# Patient Record
Sex: MALE | Race: WHITE
[De-identification: names, ages, dates, MRNs, and addresses within clinical notes are randomized per-mention and may not be internally consistent; named-entity substitution may affect disease eponyms.]

---

## 2020-11-04 ENCOUNTER — HOSPITAL ENCOUNTER (EMERGENCY)
Dept: HOSPITAL 76 - ED | Age: 70
Discharge: ANOTHER HOSPITAL | End: 2020-11-04
Payer: MEDICARE

## 2020-11-04 VITALS — DIASTOLIC BLOOD PRESSURE: 58 MMHG | SYSTOLIC BLOOD PRESSURE: 128 MMHG

## 2020-11-04 DIAGNOSIS — I51.7: ICD-10-CM

## 2020-11-04 DIAGNOSIS — I44.2: ICD-10-CM

## 2020-11-04 DIAGNOSIS — R11.0: ICD-10-CM

## 2020-11-04 DIAGNOSIS — R00.1: Primary | ICD-10-CM

## 2020-11-04 DIAGNOSIS — R42: ICD-10-CM

## 2020-11-04 DIAGNOSIS — I44.7: ICD-10-CM

## 2020-11-04 LAB
ALBUMIN DIAFP-MCNC: 4.7 G/DL (ref 3.2–5.5)
ALBUMIN/GLOB SERPL: 1.4 {RATIO} (ref 1–2.2)
ALP SERPL-CCNC: 71 IU/L (ref 42–121)
ALT SERPL W P-5'-P-CCNC: 33 IU/L (ref 10–60)
ANION GAP SERPL CALCULATED.4IONS-SCNC: 11 MMOL/L (ref 6–13)
AST SERPL W P-5'-P-CCNC: 22 IU/L (ref 10–42)
BASOPHILS NFR BLD AUTO: 0 10^3/UL (ref 0–0.1)
BASOPHILS NFR BLD AUTO: 0.3 %
BILIRUB BLD-MCNC: 1.8 MG/DL (ref 0.2–1)
BUN SERPL-MCNC: 11 MG/DL (ref 6–20)
CALCIUM UR-MCNC: 9.6 MG/DL (ref 8.5–10.3)
CHLORIDE SERPL-SCNC: 106 MMOL/L (ref 101–111)
CO2 SERPL-SCNC: 25 MMOL/L (ref 21–32)
CREAT SERPLBLD-SCNC: 0.9 MG/DL (ref 0.6–1.2)
EOSINOPHIL # BLD AUTO: 0 10^3/UL (ref 0–0.7)
EOSINOPHIL NFR BLD AUTO: 0.2 %
ERYTHROCYTE [DISTWIDTH] IN BLOOD BY AUTOMATED COUNT: 13.2 % (ref 12–15)
GLOBULIN SER-MCNC: 3.3 G/DL (ref 2.1–4.2)
GLUCOSE SERPL-MCNC: 116 MG/DL (ref 70–100)
HGB UR QL STRIP: 15.9 G/DL (ref 14–18)
LIPASE SERPL-CCNC: 32 U/L (ref 22–51)
LYMPHOCYTES # SPEC AUTO: 2.5 10^3/UL (ref 1.5–3.5)
LYMPHOCYTES NFR BLD AUTO: 28.4 %
MCH RBC QN AUTO: 30.1 PG (ref 27–31)
MCHC RBC AUTO-ENTMCNC: 32.4 G/DL (ref 32–36)
MCV RBC AUTO: 92.6 FL (ref 80–94)
MONOCYTES # BLD AUTO: 0.7 10^3/UL (ref 0–1)
MONOCYTES NFR BLD AUTO: 7.9 %
NEUTROPHILS # BLD AUTO: 5.5 10^3/UL (ref 1.5–6.6)
NEUTROPHILS # SNV AUTO: 8.7 X10^3/UL (ref 4.8–10.8)
NEUTROPHILS NFR BLD AUTO: 63 %
PDW BLD AUTO: 11.7 FL (ref 7.4–11.4)
PLATELET # BLD: 203 10^3/UL (ref 130–450)
PROT SPEC-MCNC: 8 G/DL (ref 6.7–8.2)
RBC MAR: 5.29 10^6/UL (ref 4.7–6.1)
SODIUM SERPLBLD-SCNC: 142 MMOL/L (ref 135–145)

## 2020-11-04 PROCEDURE — 36415 COLL VENOUS BLD VENIPUNCTURE: CPT

## 2020-11-04 PROCEDURE — 83690 ASSAY OF LIPASE: CPT

## 2020-11-04 PROCEDURE — 84484 ASSAY OF TROPONIN QUANT: CPT

## 2020-11-04 PROCEDURE — 85025 COMPLETE CBC W/AUTO DIFF WBC: CPT

## 2020-11-04 PROCEDURE — 83880 ASSAY OF NATRIURETIC PEPTIDE: CPT

## 2020-11-04 PROCEDURE — 71045 X-RAY EXAM CHEST 1 VIEW: CPT

## 2020-11-04 PROCEDURE — 96374 THER/PROPH/DIAG INJ IV PUSH: CPT

## 2020-11-04 PROCEDURE — 96375 TX/PRO/DX INJ NEW DRUG ADDON: CPT

## 2020-11-04 PROCEDURE — 93005 ELECTROCARDIOGRAM TRACING: CPT

## 2020-11-04 PROCEDURE — 99291 CRITICAL CARE FIRST HOUR: CPT

## 2020-11-04 PROCEDURE — 80053 COMPREHEN METABOLIC PANEL: CPT

## 2020-11-04 NOTE — ED PHYSICIAN DOCUMENTATION
PD HPI CHEST PAIN





- Stated complaint


Stated Complaint: LOW HR





- Chief complaint


Chief Complaint: Cardiac





- History obtained from


History obtained from: Patient





- Additional information


Additional information: 





Early Monday morning, 2 days ago he woke up and felt sweaty and a little short 

of breath.  Subsequently had fatigue.  The day prior he was hiking and doing 

everything without issue.  He has a history of mild bradycardia but he noticed 

that his pulse was even lower than usual.  He has not been had any chest pain 

but he does have fatigue and shortness of breath with exertion.  He is not on an

y rate lowering medications, his only prescriptions are atorvastatin and 

amlodipine.





Review of Systems


Ten Systems: 10 systems reviewed and negative


Constitutional: reports: Fatigue.  denies: Fever, Chills


Cardiac: denies: Palpitations


Respiratory: reports: Dyspnea.  denies: Cough


GI: denies: Abdominal Pain, Nausea, Vomiting





PD PAST MEDICAL HISTORY





- Allergies


Allergies/Adverse Reactions: 


                                    Allergies











Allergy/AdvReac Type Severity Reaction Status Date / Time


 


No Known Drug Allergies Allergy   Verified 11/04/20 12:50














PD ED PE NORMAL





- Vitals


Vital signs reviewed: Yes





- General


General: Alert and oriented X 3, No acute distress





- HEENT


HEENT: PERRL, EOMI





- Neck


Neck: Supple, no meningeal sign, No bony TTP





- Cardiac


Cardiac: No murmur, Other (Very bradycardic)





- Respiratory


Respiratory: No respiratory distress, Clear bilaterally





- Abdomen


Abdomen: Non tender





- Back


Back: No CVA TTP, No spinal TTP





- Derm


Derm: Normal color, Warm and dry





- Extremities


Extremities: No edema, No calf tenderness / cord





- Neuro


Neuro: Alert and oriented X 3, Normal speech





Results





- Vitals


Vitals: 


                               Vital Signs - 24 hr











  11/04/20 11/04/20 11/04/20





  12:42 13:00 13:13


 


Temperature 36.4 C L 36.8 C 


 


Heart Rate 31 L 31 L 32 L


 


Respiratory 18 18 12





Rate   


 


Blood Pressure 148/70 H 155/73 H 152/70 H


 


O2 Saturation 98 95 95














  11/04/20 11/04/20 11/04/20





  13:23 13:57 14:00


 


Temperature   


 


Heart Rate 30 L 30 L 30 L


 


Respiratory 18 22 22





Rate   


 


Blood Pressure 129/72 141/73 H 141/73 H


 


O2 Saturation 95 96 96














  11/04/20 11/04/20 11/04/20





  14:30 16:25 16:40


 


Temperature   


 


Heart Rate 28 L 75 37 L


 


Respiratory 14 14 16





Rate   


 


Blood Pressure 134/93 H 130/70 126/63


 


O2 Saturation 99 97 96














  11/04/20 11/04/20





  17:00 17:30


 


Temperature  


 


Heart Rate 36 L 32 L


 


Respiratory 18 18





Rate  


 


Blood Pressure 128/60 128/58 L


 


O2 Saturation 93 94








                                     Oxygen











O2 Source                      Room air

















- EKG (time done)


  ** 1245


Rate: Rate (enter#) (31)


Rhythm: Sinus bradycardia


Intervals: Prolonged NH


Ischemia: ST depression





  ** 1713


Rate: Rate (enter#) (36)


Rhythm: Other (complete AV block)


Intervals: LBBB


Computer interpretation: Agree with computer





- Labs


Labs: 


                                Laboratory Tests











  11/04/20 11/04/20 11/04/20





  12:51 12:51 12:51


 


WBC  8.7  


 


RBC  5.29  


 


Hgb  15.9  


 


Hct  49.0  


 


MCV  92.6  


 


MCH  30.1  


 


MCHC  32.4  


 


RDW  13.2  


 


Plt Count  203  


 


MPV  11.7 H  


 


Neut # (Auto)  5.5  


 


Lymph # (Auto)  2.5  


 


Mono # (Auto)  0.7  


 


Eos # (Auto)  0.0  


 


Baso # (Auto)  0.0  


 


Absolute Nucleated RBC  0.00  


 


Nucleated RBC %  0.0  


 


Sodium   142 


 


Potassium   4.1 


 


Chloride   106 


 


Carbon Dioxide   25 


 


Anion Gap   11.0 


 


BUN   11 


 


Creatinine   0.9 


 


Estimated GFR (MDRD)   84 L 


 


Glucose   116 H 


 


Calcium   9.6 


 


Total Bilirubin   1.8 H 


 


AST   22 


 


ALT   33 


 


Alkaline Phosphatase   71 


 


Troponin I High Sens    31.6 H*


 


B-Natriuretic Peptide   


 


Total Protein   8.0 


 


Albumin   4.7 


 


Globulin   3.3 


 


Albumin/Globulin Ratio   1.4 


 


Lipase   32 














  11/04/20 11/04/20





  12:51 17:19


 


WBC  


 


RBC  


 


Hgb  


 


Hct  


 


MCV  


 


MCH  


 


MCHC  


 


RDW  


 


Plt Count  


 


MPV  


 


Neut # (Auto)  


 


Lymph # (Auto)  


 


Mono # (Auto)  


 


Eos # (Auto)  


 


Baso # (Auto)  


 


Absolute Nucleated RBC  


 


Nucleated RBC %  


 


Sodium  


 


Potassium  


 


Chloride  


 


Carbon Dioxide  


 


Anion Gap  


 


BUN  


 


Creatinine  


 


Estimated GFR (MDRD)  


 


Glucose  


 


Calcium  


 


Total Bilirubin  


 


AST  


 


ALT  


 


Alkaline Phosphatase  


 


Troponin I High Sens   26.0 H*


 


B-Natriuretic Peptide  433 H 


 


Total Protein  


 


Albumin  


 


Globulin  


 


Albumin/Globulin Ratio  


 


Lipase  














- Rads (name of study)


  ** 1v chest


Radiology: EMP read contemporaneously (Cardiomegaly without acute disease)





PD MEDICAL DECISION MAKING





- ED course


ED course: 





70-year-old gentleman presents with symptomatic bradycardia.  Atropine briefly 

brought his heart rate up to 35 but then it settled back at 29-31.  He is 

hemodynamically stable.  Mentation is normal.  Spoke with Dr. Leyden at [facility] 

for transfer to a higher level of care.





Dr. Leyden called me, they couldn't find a bed at [place].  The hospitalist at 

[facility] said he would accept but I needed to talk to their cardiologist first.  

Dr. Hilton he was called at 3:11 PM.





Spoke with the on-call cardiologist at [facility], Dr. Hilton at 3:20 PM and he 

will see in consult but defers to the hospitalist for admit and [facility] was 

called back to arrange.





Per Dr. Leyden at [facility] he is excepted by Dr. Hall to [facility] and cobras were 

completed at 3:28 PM.





Subsequently around 3:45 PM started developed significant pauses with about 6 to

8 seconds.  He did become dizzy but not syncopal with these.  Pacer pads were 

placed and a dopamine drip was begun.





His heart rate responded up to about 40 with 10 mics per kilo per minute of 

dopamine.  Notified there would be a delay to transfer because of lack of beds, 

reportedly there is a lack of beds up and down the [facility] corridor.





Notified around 5:10 PM patient was feeling worse, nauseous and gray.  Another 

key EKG was ordered, it has changed a bit since the first 1, now complete heart 

block, but the rate is not too bad at 36.  It was faxed to the cardiologist.





Given the delays and potential for decompensation, I called the ER at [facility] and

he was excepted there by Dr. Marie and cobras were completed.  He will go 

via [facility] given the potential for hemodynamic decompensation and shock.





- Critical Care


Time(min): 40


Time Includes: Direct patient care, Review records, Reassess patient, Document 

care, Coordinate care, Medical consult, Family consult for tx dec


Data interpretation: Labs, Pulse ox


Procedures excluded from critical care time: EKG





Departure





- Departure


Disposition: 02 Transfer Acute Care Hosp


Clinical Impression: 


 Symptomatic bradycardia





Condition: Serious

## 2020-11-04 NOTE — XRAY REPORT
PROCEDURE:  Chest 1 View X-Ray

 

INDICATIONS:  Chest Pain

 

TECHNIQUE:  One view of the chest was acquired.  

 

COMPARISON:  None

 

FINDINGS:  

 

Surgical changes and devices:  None.  

 

Lungs and pleura:  No pleural effusions or pneumothorax.  Lungs are clear.  

 

Mediastinum:  Mediastinal contours appear normal.  Heart is enlarged

 

Bones and chest wall:  No suspicious bony lesions.  Overlying soft tissues appear unremarkable.  

 

 

IMPRESSION:  

 

1. Cardiomegaly.

 

2. No acute cardiopulmonary disease process.

 

Reviewed by: Ntete Mullen MD, PhD on 11/4/2020 1:34 PM PST

Approved by: Nette Mullen MD, PhD on 11/4/2020 1:34 PM PST

 

 

Station ID:  [id number]

## 2022-03-10 ENCOUNTER — HOSPITAL ENCOUNTER (OUTPATIENT)
Dept: HOSPITAL 76 - LAB.N | Age: 72
Discharge: HOME | End: 2022-03-10
Attending: FAMILY MEDICINE
Payer: MEDICARE

## 2022-03-10 DIAGNOSIS — R31.9: Primary | ICD-10-CM

## 2022-03-10 LAB
ALBUMIN DIAFP-MCNC: 4.7 G/DL (ref 3.2–5.5)
ALBUMIN/GLOB SERPL: 1.6 {RATIO} (ref 1–2.2)
ALP SERPL-CCNC: 72 IU/L (ref 42–121)
ALT SERPL W P-5'-P-CCNC: 29 IU/L (ref 10–60)
ANION GAP SERPL CALCULATED.4IONS-SCNC: 11 MMOL/L (ref 6–13)
AST SERPL W P-5'-P-CCNC: 21 IU/L (ref 10–42)
BASOPHILS NFR BLD AUTO: 0 10^3/UL (ref 0–0.1)
BASOPHILS NFR BLD AUTO: 0.5 %
BILIRUB BLD-MCNC: 1.8 MG/DL (ref 0.2–1)
BUN SERPL-MCNC: 16 MG/DL (ref 6–20)
CALCIUM UR-MCNC: 9.4 MG/DL (ref 8.5–10.3)
CHLORIDE SERPL-SCNC: 102 MMOL/L (ref 101–111)
CO2 SERPL-SCNC: 25 MMOL/L (ref 21–32)
CREAT SERPLBLD-SCNC: 0.7 MG/DL (ref 0.6–1.2)
EOSINOPHIL # BLD AUTO: 0 10^3/UL (ref 0–0.7)
EOSINOPHIL NFR BLD AUTO: 0.5 %
ERYTHROCYTE [DISTWIDTH] IN BLOOD BY AUTOMATED COUNT: 12.5 % (ref 12–15)
GFRSERPLBLD MDRD-ARVRAT: 111 ML/MIN/{1.73_M2} (ref 89–?)
GLOBULIN SER-MCNC: 2.9 G/DL (ref 2.1–4.2)
GLUCOSE SERPL-MCNC: 107 MG/DL (ref 70–100)
HCT VFR BLD AUTO: 45.3 % (ref 42–52)
HGB UR QL STRIP: 15.3 G/DL (ref 14–18)
LYMPHOCYTES # SPEC AUTO: 2.1 10^3/UL (ref 1.5–3.5)
LYMPHOCYTES NFR BLD AUTO: 32.1 %
MCH RBC QN AUTO: 30.8 PG (ref 27–31)
MCHC RBC AUTO-ENTMCNC: 33.8 G/DL (ref 32–36)
MCV RBC AUTO: 91.1 FL (ref 80–94)
MONOCYTES # BLD AUTO: 0.6 10^3/UL (ref 0–1)
MONOCYTES NFR BLD AUTO: 9.3 %
NEUTROPHILS # BLD AUTO: 3.7 10^3/UL (ref 1.5–6.6)
NEUTROPHILS # SNV AUTO: 6.4 X10^3/UL (ref 4.8–10.8)
NEUTROPHILS NFR BLD AUTO: 57.4 %
NRBC # BLD AUTO: 0 /100WBC
NRBC # BLD AUTO: 0 X10^3/UL
PDW BLD AUTO: 12.1 FL (ref 7.4–11.4)
PLATELET # BLD: 187 10^3/UL (ref 130–450)
POTASSIUM SERPL-SCNC: 4.1 MMOL/L (ref 3.5–5)
PROT SPEC-MCNC: 7.6 G/DL (ref 6.7–8.2)
RBC MAR: 4.97 10^6/UL (ref 4.7–6.1)
SODIUM SERPLBLD-SCNC: 138 MMOL/L (ref 135–145)

## 2022-03-10 PROCEDURE — 36415 COLL VENOUS BLD VENIPUNCTURE: CPT

## 2022-03-10 PROCEDURE — 80053 COMPREHEN METABOLIC PANEL: CPT

## 2022-03-10 PROCEDURE — 85025 COMPLETE CBC W/AUTO DIFF WBC: CPT

## 2022-03-10 PROCEDURE — 87086 URINE CULTURE/COLONY COUNT: CPT

## 2022-10-26 ENCOUNTER — HOSPITAL ENCOUNTER (OUTPATIENT)
Dept: HOSPITAL 76 - DI | Age: 72
Discharge: HOME | End: 2022-10-26
Attending: PODIATRIST
Payer: MEDICARE

## 2022-10-26 DIAGNOSIS — M19.072: Primary | ICD-10-CM

## 2022-10-26 DIAGNOSIS — M20.41: ICD-10-CM

## 2022-10-26 DIAGNOSIS — M20.42: ICD-10-CM

## 2022-10-26 DIAGNOSIS — M19.071: ICD-10-CM

## 2022-10-26 NOTE — XRAY REPORT
PROCEDURE:  Foot 3 View BILAT

 

INDICATIONS:  BILATERAL FOOT PAIN

 

TECHNIQUE:  3 views of each foot were acquired.  

 

COMPARISON:  None

 

FINDINGS:  

 

Bones: No acute fracture or dislocation. There is severe degenerative joint space loss, spurring, and
 subcortical sclerosis at the first metatarsophalangeal joints bilaterally, right slightly worse than
 left. There are hammertoe deformities 2 through 5 bilaterally. Periarticular erosion is seen at the 
fourth left tarsometatarsal articulation.

 

Soft tissues:  No tibiotalar joint effusion.  Achilles tendons appear normal.  

 

IMPRESSION:  

 

1. Symmetric bilateral first MTP joint degeneration.

2. Left fourth TMT joint erosion raising possibility of an inflammatory arthritic process.

3. Symmetric bilateral 2 through fifth hammertoe deformities.

 

Reviewed by: Eva Blanco MD on 10/26/2022 5:09 PM CALLIE

Approved by: Eva Blanco MD on 10/26/2022 5:09 PM CALLIE

 

 

Station ID:  [id number]

## 2023-04-14 ENCOUNTER — HOSPITAL ENCOUNTER (EMERGENCY)
Dept: HOSPITAL 76 - ED | Age: 73
Discharge: HOME | End: 2023-04-14
Payer: MEDICARE

## 2023-04-14 VITALS — SYSTOLIC BLOOD PRESSURE: 132 MMHG | DIASTOLIC BLOOD PRESSURE: 90 MMHG

## 2023-04-14 DIAGNOSIS — R07.89: Primary | ICD-10-CM

## 2023-04-14 LAB
ALBUMIN DIAFP-MCNC: 4.2 G/DL (ref 3.2–5.5)
ALBUMIN/GLOB SERPL: 1.5 {RATIO} (ref 1–2.2)
ALP SERPL-CCNC: 62 IU/L (ref 42–121)
ALT SERPL W P-5'-P-CCNC: 25 IU/L (ref 10–60)
ANION GAP SERPL CALCULATED.4IONS-SCNC: 9 MMOL/L (ref 6–13)
AST SERPL W P-5'-P-CCNC: 18 IU/L (ref 10–42)
BASOPHILS NFR BLD AUTO: 0 10^3/UL (ref 0–0.1)
BASOPHILS NFR BLD AUTO: 0.4 %
BILIRUB BLD-MCNC: 1.4 MG/DL (ref 0.2–1)
BUN SERPL-MCNC: 12 MG/DL (ref 6–20)
CALCIUM UR-MCNC: 9 MG/DL (ref 8.5–10.3)
CHLORIDE SERPL-SCNC: 104 MMOL/L (ref 101–111)
CO2 SERPL-SCNC: 26 MMOL/L (ref 21–32)
CREAT SERPLBLD-SCNC: 0.7 MG/DL (ref 0.6–1.2)
EOSINOPHIL # BLD AUTO: 0 10^3/UL (ref 0–0.7)
EOSINOPHIL NFR BLD AUTO: 0.4 %
ERYTHROCYTE [DISTWIDTH] IN BLOOD BY AUTOMATED COUNT: 12.4 % (ref 12–15)
GFRSERPLBLD MDRD-ARVRAT: 111 ML/MIN/{1.73_M2} (ref 89–?)
GLOBULIN SER-MCNC: 2.8 G/DL (ref 2.1–4.2)
GLUCOSE SERPL-MCNC: 125 MG/DL (ref 70–100)
HCT VFR BLD AUTO: 46.1 % (ref 42–52)
HGB UR QL STRIP: 15.4 G/DL (ref 14–18)
LIPASE SERPL-CCNC: 35 U/L (ref 22–51)
LYMPHOCYTES # SPEC AUTO: 2.4 10^3/UL (ref 1.5–3.5)
LYMPHOCYTES NFR BLD AUTO: 32.8 %
MCH RBC QN AUTO: 30.2 PG (ref 27–31)
MCHC RBC AUTO-ENTMCNC: 33.4 G/DL (ref 32–36)
MCV RBC AUTO: 90.4 FL (ref 80–94)
MONOCYTES # BLD AUTO: 0.7 10^3/UL (ref 0–1)
MONOCYTES NFR BLD AUTO: 8.9 %
NEUTROPHILS # BLD AUTO: 4.2 10^3/UL (ref 1.5–6.6)
NEUTROPHILS # SNV AUTO: 7.3 X10^3/UL (ref 4.8–10.8)
NEUTROPHILS NFR BLD AUTO: 57.2 %
NRBC # BLD AUTO: 0 /100WBC
NRBC # BLD AUTO: 0 X10^3/UL
PDW BLD AUTO: 11.1 FL (ref 7.4–11.4)
PLATELET # BLD: 167 10^3/UL (ref 130–450)
POTASSIUM SERPL-SCNC: 3.9 MMOL/L (ref 3.5–5)
PROT SPEC-MCNC: 7 G/DL (ref 6.7–8.2)
RBC MAR: 5.1 10^6/UL (ref 4.7–6.1)
SODIUM SERPLBLD-SCNC: 139 MMOL/L (ref 135–145)

## 2023-04-14 PROCEDURE — 83690 ASSAY OF LIPASE: CPT

## 2023-04-14 PROCEDURE — 84484 ASSAY OF TROPONIN QUANT: CPT

## 2023-04-14 PROCEDURE — 80053 COMPREHEN METABOLIC PANEL: CPT

## 2023-04-14 PROCEDURE — 93005 ELECTROCARDIOGRAM TRACING: CPT

## 2023-04-14 PROCEDURE — 99283 EMERGENCY DEPT VISIT LOW MDM: CPT

## 2023-04-14 PROCEDURE — 99284 EMERGENCY DEPT VISIT MOD MDM: CPT

## 2023-04-14 PROCEDURE — 36415 COLL VENOUS BLD VENIPUNCTURE: CPT

## 2023-04-14 PROCEDURE — 71045 X-RAY EXAM CHEST 1 VIEW: CPT

## 2023-04-14 PROCEDURE — 85025 COMPLETE CBC W/AUTO DIFF WBC: CPT

## 2023-04-14 NOTE — ED PHYSICIAN DOCUMENTATION
PD HPI CHEST PAIN





- Stated complaint


Stated Complaint: CHEST PAIN





- Chief complaint


Chief Complaint: Cardiac





- History obtained from


History obtained from: Patient





- Additional information


Additional information: 





73-year-old gentleman with history of third-degree heart block necessitating a 

permanent pacemaker a few years ago.  Around that time had an ischemic work-up 

with negative coronary angiogram per him.  Over the last 5 days or so he has had

some intermittent pain of the left pectoral area occasionally radiating to the 

left jaw with intermittent substernal chest pressure that is very mild.  He says

the symptoms get better if he exercises, specifically they all resolved 

yesterday while stacking firewood.


He has had a little bit of shortness of breath and nausea with this.  No sweats.





PD PAST MEDICAL HISTORY





- Past Medical History


Cardiovascular: Hypertension, High cholesterol





- Present Medications


Home Medications: 


                                Ambulatory Orders











 Medication  Instructions  Recorded  Confirmed


 


Amlodipine Besylate [Norvasc] 10 mg PO DAILY 04/14/23 04/14/23


 


Atorvastatin Calcium 40 mg PO DAILY 04/14/23 04/14/23


 


Gabapentin [Neurontin] 300 mg PO BID 04/14/23 04/14/23


 


Multivitamin 1 each PO DAILY 04/14/23 04/14/23


 


Omeprazole Magnesium 20 mg PO DAILY 04/14/23 04/14/23














- Allergies


Allergies/Adverse Reactions: 


                                    Allergies











Allergy/AdvReac Type Severity Reaction Status Date / Time


 


No Known Drug Allergies Allergy   Verified 04/14/23 15:55














- Social History


Does the pt smoke?: No


Smoking Status: Never smoker


Does the pt drink ETOH?: Yes


Does the pt have substance abuse?: No





- Immunizations


Immunizations are current?: Yes





PD ED PE NORMAL





- Vitals


Vital signs reviewed: Yes





- General


General: Alert and oriented X 3, No acute distress





- Cardiac


Cardiac: RRR, No murmur





- Respiratory


Respiratory: No respiratory distress, Clear bilaterally





- Abdomen


Abdomen: Non tender





- Extremities


Extremities: No edema, No calf tenderness / cord





- Neuro


Neuro: Alert and oriented X 3, Normal speech





Results





- Vitals


Vitals: 


                               Vital Signs - 24 hr











  04/14/23 04/14/23 04/14/23





  15:55 16:23 16:41


 


Temperature 36.5 C  


 


Heart Rate 71 68 65


 


Respiratory 16 16 18





Rate   


 


Blood Pressure 130/100 H 139/85 H 127/85 H


 


O2 Saturation 98 99 97








                                     Oxygen











O2 Source                      Room air

















- EKG (time done)


  ** 1604


EKG releavant findings:: 


EKG personally interpreted by author of this note. Relevant findings are: 





Rate: Rate (enter#) (65)


Rhythm: NSR


Axis: Normal


Intervals: Prolonged IN, RBBB, Other (Left anterior fascicular block)


QRS: Normal


Ischemia: Normal ST segments





- Labs


Labs: 


                                Laboratory Tests











  04/14/23 04/14/23 04/14/23





  16:15 16:15 16:15


 


WBC  7.3  


 


RBC  5.10  


 


Hgb  15.4  


 


Hct  46.1  


 


MCV  90.4  


 


MCH  30.2  


 


MCHC  33.4  


 


RDW  12.4  


 


Plt Count  167  


 


MPV  11.1  


 


Neut # (Auto)  4.2  


 


Lymph # (Auto)  2.4  


 


Mono # (Auto)  0.7  


 


Eos # (Auto)  0.0  


 


Baso # (Auto)  0.0  


 


Absolute Nucleated RBC  0.00  


 


Nucleated RBC %  0.0  


 


Sodium   139 


 


Potassium   3.9 


 


Chloride   104 


 


Carbon Dioxide   26 


 


Anion Gap   9.0 


 


BUN   12 


 


Creatinine   0.7 


 


Estimated GFR (MDRD)   111 


 


Glucose   125 H 


 


Calcium   9.0 


 


Total Bilirubin   1.4 H 


 


AST   18 


 


ALT   25 


 


Alkaline Phosphatase   62 


 


Troponin I High Sens    8.9


 


Total Protein   7.0 


 


Albumin   4.2 


 


Globulin   2.8 


 


Albumin/Globulin Ratio   1.5 


 


Lipase   35 














- Rads (name of study)


  ** Single view chest x-ray is unremarkable/normal


Relevant Findings:: Final report received, EMP independent interpretation of 

test





PD Medical Decision Making





- ED course


ED course: 





73-year-old gentleman with 5 days of atypical chest pain, better with exertion 

which is very reassuring.  CBC reviewed and normal.  CMP reviewed and 

unremarkable.  Troponin normal/negative.  EKG without ischemic findings.  

Nothing in the history or physical to suggest dissection, PE/thromboembolic 

disease, pneumonia.





Departure





- Departure


Disposition: 01 Home, Self Care


Clinical Impression: 


 Atypical chest pain





Condition: Good


Record reviewed to determine appropriate education?: Yes


Instructions:  ED Chest Pain Atypical Unkn Cause


Comments: 


Call your cardiologist on Monday to arrange for follow-up, return for new or 

worsening symptoms.  Baby aspirin a day as discussed starting tomorrow until 

cardiologist advises you otherwise.

## 2023-04-14 NOTE — XRAY REPORT
PROCEDURE:  Chest 1 View X-Ray

 

INDICATIONS:  Chest pain

 

TECHNIQUE:  One view of the chest was acquired.  

 

COMPARISON:  11/4/2020.

 

FINDINGS:  

 

Surgical changes and devices:  None.  

 

Lungs and pleura:  No pleural effusions or pneumothorax.  Lungs are clear.  

 

Mediastinum:  Mediastinal contours appear normal. Cardiomegaly

 

Bones and chest wall:  No suspicious bony lesions.  Overlying soft tissues appear unremarkable.  

 

 

IMPRESSION:  

 

Cardiomegaly. No acute cardiopulmonary process.

 

 

 

Reviewed by: Jcarlos Garcia MD on 4/14/2023 4:29 PM PDT

Approved by: Jcarlos Garcia MD on 4/14/2023 4:29 PM PDT

 

 

Station ID:  [id number]